# Patient Record
(demographics unavailable — no encounter records)

---

## 2024-10-22 NOTE — REVIEW OF SYSTEMS
[Fever] : no fever [Chills] : no chills [Chest Pain] : no chest pain [Palpitations] : no palpitations [Shortness Of Breath] : no shortness of breath [Headache] : no headache [Dizziness] : no dizziness

## 2024-10-22 NOTE — PHYSICAL EXAM
[No Acute Distress] : no acute distress [Well-Appearing] : well-appearing [No Respiratory Distress] : no respiratory distress  [Clear to Auscultation] : lungs were clear to auscultation bilaterally [Normal Rate] : normal rate  [Regular Rhythm] : with a regular rhythm [Normal S1, S2] : normal S1 and S2 [Coordination Grossly Intact] : coordination grossly intact [No Focal Deficits] : no focal deficits [Normal Gait] : normal gait [Normal Affect] : the affect was normal [Normal Insight/Judgement] : insight and judgment were intact

## 2024-10-22 NOTE — ASSESSMENT
[FreeTextEntry1] : #ADD - Presenting for follow up and med refill - On Adderall 20mg TID, tolerating well - Refill today,  checked

## 2024-10-22 NOTE — HISTORY OF PRESENT ILLNESS
[FreeTextEntry1] : Follow up, med renewal  [de-identified] : 37yo male with PMH of ADHD, anxiety who presents to the office for follow up.  ADD: On Adderall 20mg TID. Improved attention span with Adderall. Denies chest pain, palpitations, headaches, dizziness, shortness of breath or anorexia.

## 2025-02-05 NOTE — ASSESSMENT
[FreeTextEntry1] : #ED - Patient presenting to discuss Viagra - Reports difficulty with maintaining erections, no issues with obtaining - Start with Viagra 50mg, advised on adverse effects  - F/u as needed for dose adjustments

## 2025-02-05 NOTE — HISTORY OF PRESENT ILLNESS
[FreeTextEntry1] : Follow up [de-identified] : 38yo male with PMH of ADHD, anxiety who presents to the office for follow up.  Wishes to discuss starting medication for erectile dysfunction. Endorses issues with maintaining an erection, has no issue with obtaining one.  He has researched Viagra and would like to try this medication.